# Patient Record
Sex: MALE | URBAN - METROPOLITAN AREA
[De-identification: names, ages, dates, MRNs, and addresses within clinical notes are randomized per-mention and may not be internally consistent; named-entity substitution may affect disease eponyms.]

---

## 2022-11-16 ENCOUNTER — APPOINTMENT (OUTPATIENT)
Dept: URBAN - METROPOLITAN AREA SURGERY 19 | Age: 67
Setting detail: DERMATOLOGY
End: 2022-11-17

## 2022-11-16 DIAGNOSIS — L987 UNSPECIFIED HYPERTROPHIC AND ATROPHIC CONDITIONS OF SKIN: ICD-10-CM

## 2022-11-16 DIAGNOSIS — L82.1 OTHER SEBORRHEIC KERATOSIS: ICD-10-CM

## 2022-11-16 DIAGNOSIS — L57.0 ACTINIC KERATOSIS: ICD-10-CM

## 2022-11-16 DIAGNOSIS — L919 UNSPECIFIED HYPERTROPHIC AND ATROPHIC CONDITIONS OF SKIN: ICD-10-CM

## 2022-11-16 DIAGNOSIS — L81.4 OTHER MELANIN HYPERPIGMENTATION: ICD-10-CM

## 2022-11-16 DIAGNOSIS — D18.0 HEMANGIOMA: ICD-10-CM

## 2022-11-16 DIAGNOSIS — L909 UNSPECIFIED HYPERTROPHIC AND ATROPHIC CONDITIONS OF SKIN: ICD-10-CM

## 2022-11-16 PROBLEM — D18.01 HEMANGIOMA OF SKIN AND SUBCUTANEOUS TISSUE: Status: ACTIVE | Noted: 2022-11-16

## 2022-11-16 PROBLEM — D23.71 OTHER BENIGN NEOPLASM OF SKIN OF RIGHT LOWER LIMB, INCLUDING HIP: Status: ACTIVE | Noted: 2022-11-16

## 2022-11-16 PROBLEM — C44.319 BASAL CELL CARCINOMA OF SKIN OF OTHER PARTS OF FACE: Status: ACTIVE | Noted: 2022-11-16

## 2022-11-16 PROCEDURE — 17000 DESTRUCT PREMALG LESION: CPT | Mod: 59

## 2022-11-16 PROCEDURE — 99203 OFFICE O/P NEW LOW 30 MIN: CPT | Mod: 25

## 2022-11-16 PROCEDURE — OTHER COUNSELING: OTHER

## 2022-11-16 PROCEDURE — OTHER LIQUID NITROGEN: OTHER

## 2022-11-16 PROCEDURE — OTHER MIPS QUALITY: OTHER

## 2022-11-16 PROCEDURE — OTHER SUNSCREEN RECOMMENDATIONS: OTHER

## 2022-11-16 PROCEDURE — OTHER SHAVE REMOVAL (NO PATHOLOGY): OTHER

## 2022-11-16 PROCEDURE — 11301 SHAVE SKIN LESION 0.6-1.0 CM: CPT

## 2022-11-16 ASSESSMENT — LOCATION ZONE DERM
LOCATION ZONE: TRUNK
LOCATION ZONE: FACE
LOCATION ZONE: LEG
LOCATION ZONE: EAR

## 2022-11-16 ASSESSMENT — LOCATION SIMPLE DESCRIPTION DERM
LOCATION SIMPLE: RIGHT EAR
LOCATION SIMPLE: LEFT EAR
LOCATION SIMPLE: LEFT CHEEK
LOCATION SIMPLE: RIGHT UPPER BACK
LOCATION SIMPLE: RIGHT POSTERIOR THIGH

## 2022-11-16 ASSESSMENT — LOCATION DETAILED DESCRIPTION DERM
LOCATION DETAILED: LEFT SUPERIOR HELIX
LOCATION DETAILED: LEFT INFERIOR CENTRAL MALAR CHEEK
LOCATION DETAILED: RIGHT PROXIMAL POSTERIOR THIGH
LOCATION DETAILED: RIGHT INFERIOR POSTERIOR HELIX
LOCATION DETAILED: RIGHT SUPERIOR UPPER BACK

## 2022-11-16 NOTE — HPI: EVALUATION OF SKIN LESION(S)
Hpi Title: Evaluation of a Skin Lesion
What Type Of Note Output Would You Prefer (Optional)?: Standard Output
How Severe Are Your Spot(S)?: mild
Have Your Spot(S) Been Treated In The Past?: has not been treated
Additional History: Biopsy by [facility] showed BCC.
Year Removed: 1900

## 2022-11-16 NOTE — PROCEDURE: SHAVE REMOVAL (NO PATHOLOGY)
Medical Necessity Information: It is in your best interest to select a reason for this procedure from the list below. All of these items fulfill various CMS LCD requirements except the new and changing color options.
Include Z78.9 (Other Specified Conditions Influencing Health Status) As An Associated Diagnosis?: No
Medical Necessity Clause: This procedure was medically necessary because the lesion that was treated was:
Detail Level: Simple
Size Of Lesion In Cm: 1
X Size Of Lesion In Cm (Optional): 0
Anesthesia Type: 1% lidocaine with epinephrine
Anesthesia Volume In Cc: 0.5
Hemostasis: Aluminum Chloride and Electrocautery
Wound Care: Petrolatum
Consent was obtained from the patient. The risks and benefits to therapy were discussed in detail. Specifically, the risks of infection, scarring, bleeding, prolonged wound healing, incomplete removal, allergy to anesthesia, nerve injury and recurrence were addressed. Prior to the procedure, the treatment site was clearly identified and confirmed by the patient. All components of Universal Protocol/PAUSE Rule completed.
Post-Care Instructions: I reviewed with the patient in detail post-care instructions. Patient is to keep the biopsy site dry overnight, and then apply bacitracin twice daily until healed. Patient may apply hydrogen peroxide soaks to remove any crusting.

## 2023-02-21 ENCOUNTER — APPOINTMENT (OUTPATIENT)
Dept: URBAN - METROPOLITAN AREA SURGERY 19 | Age: 68
Setting detail: DERMATOLOGY
End: 2023-02-22

## 2023-02-21 VITALS — SYSTOLIC BLOOD PRESSURE: 152 MMHG | HEART RATE: 84 BPM | DIASTOLIC BLOOD PRESSURE: 81 MMHG | TEMPERATURE: 97.4 F

## 2023-02-21 VITALS — DIASTOLIC BLOOD PRESSURE: 105 MMHG | SYSTOLIC BLOOD PRESSURE: 171 MMHG | HEART RATE: 73 BPM

## 2023-02-21 PROBLEM — C44.319 BASAL CELL CARCINOMA OF SKIN OF OTHER PARTS OF FACE: Status: ACTIVE | Noted: 2023-02-21

## 2023-02-21 PROCEDURE — OTHER MOHS SURGERY: OTHER

## 2023-02-21 PROCEDURE — 12052 INTMD RPR FACE/MM 2.6-5.0 CM: CPT

## 2023-02-21 PROCEDURE — OTHER MIPS QUALITY: OTHER

## 2023-02-21 PROCEDURE — 17311 MOHS 1 STAGE H/N/HF/G: CPT

## 2023-02-21 NOTE — PROCEDURE: MIPS QUALITY
Quality 130: Documentation Of Current Medications In The Medical Record: Current Medications Documented
Quality 226: Preventive Care And Screening: Tobacco Use: Screening And Cessation Intervention: Patient screened for tobacco use, is a smoker AND received Cessation Counseling within the Previous 12 Months
Quality 110: Preventive Care And Screening: Influenza Immunization: Influenza Immunization previously received during influenza season
Detail Level: Detailed

## 2023-04-26 NOTE — PROCEDURE: MOHS SURGERY
Pharyngeal phase c/b latency in the swallow trigger to the level of the valleculae. Base of tongue/BoT retraction and pharyngeal constriction were moderately reduced and resulted in reduced pharyngeal bolus propulsion. Mild-moderate vallecular residue present; mild-moderate pyriform sinus stasis.  Hyolaryngeal elevation and excursion were moderately- significantly reduced. Reduced epiglottic inversion, appearing to abut the PPW/ posterior pharyngeal wall . Adequate relaxation of UES.   -Mildly thick liquids tsp Neutral head position with deep penetration most often during the swallow trigger onto the cords w/ subsequent aspiration which was silent in mild-moderate amounts as related to the bolus size. Cue to cough from SLP with partial expectoration of aspirated/penetrated materials.  -Chin tuck attempted however pt with reduced ability to carryover directive during time of testing, possibly also as related to discomfort 3 out of 10 when completing his postural strategy.  -Chin tuck with cued cough; penetration during the swallow to the vocal cords which eventually descended despite partial expectoration from airway.   -Appreciated residue eventually descend despite multiple efforts at cued coughing, resulting in aspiration. Pharyngeal phase c/b latency in the swallow trigger to the level of the valleculae. Base of tongue/BoT retraction and pharyngeal constriction were reduced and resulted in reduced pharyngeal bolus propulsion. Mild-moderate vallecular residue present; mild pyriform sinus stasis.  Hyolaryngeal elevation and excursion were moderately- significantly reduced. Reduced epiglottic inversion. Adequate relaxation of UES. Trace penetration captured inconsistently which was fully retrieved.   To note, motion artifact and postural changes by the patient, at times, obscured viewing throughout this study. Area M Indication Text: Tumors in this location are included in Area M (cheek, forehead, scalp, neck, jawline and pretibial skin).  Mohs surgery is indicated for tumors in these anatomic locations.